# Patient Record
Sex: MALE | Employment: FULL TIME | URBAN - METROPOLITAN AREA
[De-identification: names, ages, dates, MRNs, and addresses within clinical notes are randomized per-mention and may not be internally consistent; named-entity substitution may affect disease eponyms.]

---

## 2024-08-09 ENCOUNTER — OFFICE VISIT (OUTPATIENT)
Dept: FAMILY MEDICINE | Facility: CLINIC | Age: 49
End: 2024-08-09
Payer: COMMERCIAL

## 2024-08-09 ENCOUNTER — ORDERS ONLY (AUTO-RELEASED) (OUTPATIENT)
Dept: FAMILY MEDICINE | Facility: CLINIC | Age: 49
End: 2024-08-09

## 2024-08-09 VITALS
SYSTOLIC BLOOD PRESSURE: 117 MMHG | HEIGHT: 74 IN | BODY MASS INDEX: 34.6 KG/M2 | OXYGEN SATURATION: 95 % | WEIGHT: 269.6 LBS | DIASTOLIC BLOOD PRESSURE: 75 MMHG | TEMPERATURE: 98.1 F | RESPIRATION RATE: 20 BRPM | HEART RATE: 86 BPM

## 2024-08-09 DIAGNOSIS — J30.2 SEASONAL ALLERGIC RHINITIS, UNSPECIFIED TRIGGER: ICD-10-CM

## 2024-08-09 DIAGNOSIS — E78.1 HYPERTRIGLYCERIDEMIA: ICD-10-CM

## 2024-08-09 DIAGNOSIS — G47.33 OSA (OBSTRUCTIVE SLEEP APNEA): ICD-10-CM

## 2024-08-09 DIAGNOSIS — Z11.59 NEED FOR HEPATITIS C SCREENING TEST: ICD-10-CM

## 2024-08-09 DIAGNOSIS — M1A.0790 CHRONIC IDIOPATHIC GOUT INVOLVING TOE WITHOUT TOPHUS, UNSPECIFIED LATERALITY: ICD-10-CM

## 2024-08-09 DIAGNOSIS — Z12.11 SCREEN FOR COLON CANCER: Primary | ICD-10-CM

## 2024-08-09 DIAGNOSIS — F41.1 GAD (GENERALIZED ANXIETY DISORDER): ICD-10-CM

## 2024-08-09 DIAGNOSIS — I77.9 PERIPHERAL ARTERIAL OCCLUSIVE DISEASE (H): ICD-10-CM

## 2024-08-09 DIAGNOSIS — Z12.11 SCREEN FOR COLON CANCER: ICD-10-CM

## 2024-08-09 DIAGNOSIS — I77.810 DILATED AORTIC ROOT (H): ICD-10-CM

## 2024-08-09 DIAGNOSIS — K21.9 GASTROESOPHAGEAL REFLUX DISEASE, UNSPECIFIED WHETHER ESOPHAGITIS PRESENT: ICD-10-CM

## 2024-08-09 DIAGNOSIS — R19.5 POSITIVE COLORECTAL CANCER SCREENING USING COLOGUARD TEST: ICD-10-CM

## 2024-08-09 DIAGNOSIS — I10 PRIMARY HYPERTENSION: ICD-10-CM

## 2024-08-09 DIAGNOSIS — F33.2 SEVERE EPISODE OF RECURRENT MAJOR DEPRESSIVE DISORDER, WITHOUT PSYCHOTIC FEATURES (H): ICD-10-CM

## 2024-08-09 PROBLEM — H90.42 SENSORINEURAL HEARING LOSS (SNHL) OF LEFT EAR WITH UNRESTRICTED HEARING OF RIGHT EAR: Status: ACTIVE | Noted: 2022-03-18

## 2024-08-09 PROBLEM — M10.9 GOUT: Status: ACTIVE | Noted: 2023-05-25

## 2024-08-09 LAB
CREAT SERPL-MCNC: 1.06 MG/DL (ref 0.67–1.17)
EGFRCR SERPLBLD CKD-EPI 2021: 85 ML/MIN/1.73M2
ERYTHROCYTE [DISTWIDTH] IN BLOOD BY AUTOMATED COUNT: 12.4 % (ref 10–15)
HCT VFR BLD AUTO: 40.4 % (ref 40–53)
HCV AB SERPL QL IA: NONREACTIVE
HGB BLD-MCNC: 14.2 G/DL (ref 13.3–17.7)
MCH RBC QN AUTO: 33.5 PG (ref 26.5–33)
MCHC RBC AUTO-ENTMCNC: 35.1 G/DL (ref 31.5–36.5)
MCV RBC AUTO: 95 FL (ref 78–100)
PLATELET # BLD AUTO: 101 10E3/UL (ref 150–450)
RBC # BLD AUTO: 4.24 10E6/UL (ref 4.4–5.9)
URATE SERPL-MCNC: 6 MG/DL (ref 3.4–7)
WBC # BLD AUTO: 6.3 10E3/UL (ref 4–11)

## 2024-08-09 PROCEDURE — 84550 ASSAY OF BLOOD/URIC ACID: CPT | Performed by: PHYSICIAN ASSISTANT

## 2024-08-09 PROCEDURE — 85027 COMPLETE CBC AUTOMATED: CPT | Performed by: PHYSICIAN ASSISTANT

## 2024-08-09 PROCEDURE — 82565 ASSAY OF CREATININE: CPT | Performed by: PHYSICIAN ASSISTANT

## 2024-08-09 PROCEDURE — 90471 IMMUNIZATION ADMIN: CPT | Performed by: PHYSICIAN ASSISTANT

## 2024-08-09 PROCEDURE — 90750 HZV VACC RECOMBINANT IM: CPT | Performed by: PHYSICIAN ASSISTANT

## 2024-08-09 PROCEDURE — 36415 COLL VENOUS BLD VENIPUNCTURE: CPT | Performed by: PHYSICIAN ASSISTANT

## 2024-08-09 PROCEDURE — 96127 BRIEF EMOTIONAL/BEHAV ASSMT: CPT | Performed by: PHYSICIAN ASSISTANT

## 2024-08-09 PROCEDURE — 99204 OFFICE O/P NEW MOD 45 MIN: CPT | Mod: 25 | Performed by: PHYSICIAN ASSISTANT

## 2024-08-09 PROCEDURE — 86803 HEPATITIS C AB TEST: CPT | Performed by: PHYSICIAN ASSISTANT

## 2024-08-09 RX ORDER — FLUTICASONE PROPIONATE 50 MCG
2 SPRAY, SUSPENSION (ML) NASAL
COMMUNITY
Start: 2024-04-26 | End: 2024-08-09

## 2024-08-09 RX ORDER — ROSUVASTATIN CALCIUM 20 MG/1
20 TABLET, COATED ORAL DAILY
Qty: 90 TABLET | Refills: 3 | Status: SHIPPED | OUTPATIENT
Start: 2024-08-09

## 2024-08-09 RX ORDER — AZELASTINE 1 MG/ML
2 SPRAY, METERED NASAL 2 TIMES DAILY
COMMUNITY
Start: 2024-05-28 | End: 2024-08-09

## 2024-08-09 RX ORDER — COLCHICINE 0.6 MG/1
0.6 TABLET ORAL
COMMUNITY
Start: 2024-04-26 | End: 2024-08-09

## 2024-08-09 RX ORDER — METOPROLOL SUCCINATE 25 MG/1
25 TABLET, EXTENDED RELEASE ORAL DAILY
Qty: 90 TABLET | Refills: 3 | Status: SHIPPED | OUTPATIENT
Start: 2024-08-09

## 2024-08-09 RX ORDER — FAMOTIDINE 20 MG/1
20 TABLET, FILM COATED ORAL
COMMUNITY
Start: 2024-04-26 | End: 2024-08-09

## 2024-08-09 RX ORDER — BUSPIRONE HYDROCHLORIDE 10 MG/1
20 TABLET ORAL 3 TIMES DAILY
COMMUNITY
Start: 2024-04-26 | End: 2024-08-09

## 2024-08-09 RX ORDER — ALLOPURINOL 100 MG/1
2 TABLET ORAL DAILY
COMMUNITY
Start: 2024-07-25

## 2024-08-09 RX ORDER — IBUPROFEN 200 MG
TABLET ORAL
COMMUNITY

## 2024-08-09 RX ORDER — AZELASTINE 1 MG/ML
2 SPRAY, METERED NASAL 2 TIMES DAILY
Qty: 120 ML | Refills: 3 | Status: SHIPPED | OUTPATIENT
Start: 2024-08-09

## 2024-08-09 RX ORDER — FAMOTIDINE 20 MG/1
20 TABLET, FILM COATED ORAL 2 TIMES DAILY
Qty: 180 TABLET | Refills: 1 | Status: SHIPPED | OUTPATIENT
Start: 2024-08-09

## 2024-08-09 RX ORDER — FLUTICASONE PROPIONATE 50 MCG
2 SPRAY, SUSPENSION (ML) NASAL DAILY
Qty: 48 G | Refills: 3 | Status: SHIPPED | OUTPATIENT
Start: 2024-08-09

## 2024-08-09 RX ORDER — ROSUVASTATIN CALCIUM 10 MG/1
10 TABLET, COATED ORAL DAILY
COMMUNITY
Start: 2024-04-26 | End: 2024-08-09

## 2024-08-09 RX ORDER — SERTRALINE HYDROCHLORIDE 100 MG/1
150 TABLET, FILM COATED ORAL DAILY
Qty: 135 TABLET | Refills: 1 | Status: SHIPPED | OUTPATIENT
Start: 2024-08-09

## 2024-08-09 RX ORDER — ASPIRIN 81 MG/1
81 TABLET, CHEWABLE ORAL
COMMUNITY

## 2024-08-09 RX ORDER — BUSPIRONE HYDROCHLORIDE 10 MG/1
20 TABLET ORAL 3 TIMES DAILY
Qty: 270 TABLET | Refills: 1 | Status: SHIPPED | OUTPATIENT
Start: 2024-08-09

## 2024-08-09 ASSESSMENT — PATIENT HEALTH QUESTIONNAIRE - PHQ9
SUM OF ALL RESPONSES TO PHQ QUESTIONS 1-9: 4
SUM OF ALL RESPONSES TO PHQ QUESTIONS 1-9: 4
10. IF YOU CHECKED OFF ANY PROBLEMS, HOW DIFFICULT HAVE THESE PROBLEMS MADE IT FOR YOU TO DO YOUR WORK, TAKE CARE OF THINGS AT HOME, OR GET ALONG WITH OTHER PEOPLE: SOMEWHAT DIFFICULT

## 2024-08-09 ASSESSMENT — ANXIETY QUESTIONNAIRES
GAD7 TOTAL SCORE: 4
8. IF YOU CHECKED OFF ANY PROBLEMS, HOW DIFFICULT HAVE THESE MADE IT FOR YOU TO DO YOUR WORK, TAKE CARE OF THINGS AT HOME, OR GET ALONG WITH OTHER PEOPLE?: SOMEWHAT DIFFICULT
5. BEING SO RESTLESS THAT IT IS HARD TO SIT STILL: NOT AT ALL
GAD7 TOTAL SCORE: 4
7. FEELING AFRAID AS IF SOMETHING AWFUL MIGHT HAPPEN: NOT AT ALL
4. TROUBLE RELAXING: SEVERAL DAYS
7. FEELING AFRAID AS IF SOMETHING AWFUL MIGHT HAPPEN: NOT AT ALL
GAD7 TOTAL SCORE: 4
IF YOU CHECKED OFF ANY PROBLEMS ON THIS QUESTIONNAIRE, HOW DIFFICULT HAVE THESE PROBLEMS MADE IT FOR YOU TO DO YOUR WORK, TAKE CARE OF THINGS AT HOME, OR GET ALONG WITH OTHER PEOPLE: SOMEWHAT DIFFICULT
2. NOT BEING ABLE TO STOP OR CONTROL WORRYING: NOT AT ALL
3. WORRYING TOO MUCH ABOUT DIFFERENT THINGS: SEVERAL DAYS
6. BECOMING EASILY ANNOYED OR IRRITABLE: SEVERAL DAYS
1. FEELING NERVOUS, ANXIOUS, OR ON EDGE: SEVERAL DAYS

## 2024-08-09 NOTE — PROGRESS NOTES
Prior to immunization administration, verified patients identity using patient s name and date of birth. Please see Immunization Activity for additional information.     Screening Questionnaire for Adult Immunization    Are you sick today?   No   Do you have allergies to medications, food, a vaccine component or latex?   No   Have you ever had a serious reaction after receiving a vaccination?   No   Do you have a long-term health problem with heart, lung, kidney, or metabolic disease (e.g., diabetes), asthma, a blood disorder, no spleen, complement component deficiency, a cochlear implant, or a spinal fluid leak?  Are you on long-term aspirin therapy?   No   Do you have cancer, leukemia, HIV/AIDS, or any other immune system problem?   No   Do you have a parent, brother, or sister with an immune system problem?   No   In the past 3 months, have you taken medications that affect  your immune system, such as prednisone, other steroids, or anticancer drugs; drugs for the treatment of rheumatoid arthritis, Crohn s disease, or psoriasis; or have you had radiation treatments?   No   Have you had a seizure, or a brain or other nervous system problem?   No   During the past year, have you received a transfusion of blood or blood    products, or been given immune (gamma) globulin or antiviral drug?   No   For women: Are you pregnant or is there a chance you could become       pregnant during the next month?   No   Have you received any vaccinations in the past 4 weeks?   No     Immunization questionnaire answers were all negative.      Patient instructed to remain in clinic for 15 minutes afterwards, and to report any adverse reactions.     Screening performed by Laurie Moreno on 8/9/2024 at 9:59 AM.

## 2024-08-09 NOTE — PROGRESS NOTES
Assessment & Plan     Screen for colon cancer  Due.  Discussed colonoscopy first Cologuard.  No contraindications to Cologuard.  Patient prefers this.  - COLOGUARD([facility]); Future    Need for hepatitis C screening test  2.  Not seen on [facility] Everywhere.  - Hepatitis C Screen Reflex to HCV RNA Quant and Genotype; Future    Seasonal allergic rhinitis, unspecified trigger  On current regimen.  Will maintain.  Recheck at annual wellness visits.  - azelastine (ASTELIN) 0.1 % nasal spray; Spray 2 sprays into both nostrils 2 times daily  - fluticasone (FLONASE) 50 MCG/ACT nasal spray; Spray 2 sprays into both nostrils daily  Medication use and side effects discussed with the patient. Patient is in complete understanding and agreement with plan.     CAYLA (generalized anxiety disorder)  Stable on current regimen.  PHQ-9 and CAYLA-7 are up-to-date and in controlled range.  Will continue and recheck at annual wellness at end of April of next year.  Sooner if concerns.  - sertraline (ZOLOFT) 100 MG tablet; Take 1.5 tablets (150 mg) by mouth daily  - busPIRone (BUSPAR) 10 MG tablet; Take 2 tablets (20 mg) by mouth 3 times daily  Medication use and side effects discussed with the patient. Patient is in complete understanding and agreement with plan.     Peripheral arterial occlusive disease (H24)  Past history.  Asymptomatic at this time.  Stable on Crestor as well as aspirin however LDL not at goal.  Would recommend this being under 70 if possible.  Also with PAD history would recommend triglyceride goal to be under 150.  Will increase Crestor and recheck fasting lipids in approximately 3 months and we will continue to max out statin until either LDL is under 50 or maxed out dose.  If triglycerides remain above 150 consider possible omega-3 treatments.  - rosuvastatin (CRESTOR) 20 MG tablet; Take 1 tablet (20 mg) by mouth daily  Medication use and side effects discussed with the patient. Patient is in complete  understanding and agreement with plan.     Hypertriglyceridemia  As above    CHRISTIANO (obstructive sleep apnea)  Future pulm appointment planned.    Severe episode of recurrent major depressive disorder, without psychotic features (H)  Stable on current regimen.  Refills not needed at this time.  Will maintain as above.  - sertraline (ZOLOFT) 100 MG tablet; Take 1.5 tablets (150 mg) by mouth daily  Medication use and side effects discussed with the patient. Patient is in complete understanding and agreement with plan.     Chronic idiopathic gout involving toe without tophus, unspecified laterality  No side effects of allopurinol.  Has not checked uric acid since dose was started.  Will recheck today and adjust dose if necessary otherwise to maintain and recheck on annual basis likely in April of next year with other remaining labs.  - Uric acid; Future  - CBC with platelets; Future  - Creatinine; Future    Primary hypertension  Stable on current regimen.  Profiled refill sent to pharmacy.  - metoprolol succinate ER (TOPROL XL) 25 MG 24 hr tablet; Take 1 tablet (25 mg) by mouth daily  Medication use and side effects discussed with the patient. Patient is in complete understanding and agreement with plan.     Gastroesophageal reflux disease, unspecified whether esophagitis present  Stable on current regimen.  Profiled refill sent to pharmacy.  - famotidine (PEPCID) 20 MG tablet; Take 1 tablet (20 mg) by mouth 2 times daily  Medication use and side effects discussed with the patient. Patient is in complete understanding and agreement with plan.     Dilated aortic root (H24)  Past history.  Will obtain annual monitoring.  Did see decrease in size from previous echo.  - Echocardiogram Complete; Future        Subjective   Jared is a 49 year old, presenting for the following health issues:  [facility] [place] [facility]        8/9/2024     9:13 AM   Additional Questions   Roomed by Rosa SINGH LPN     History of Present Illness       Hyperlipidemia:   He presents for follow up of hyperlipidemia.   He is taking medication to lower cholesterol. He is not having myalgia or other side effects to statin medications.    Reason for visit:  Establish new primary    He eats 2-3 servings of fruits and vegetables daily.He consumes 0 sweetened beverage(s) daily.He exercises with enough effort to increase his heart rate 20 to 29 minutes per day.  He exercises with enough effort to increase his heart rate 3 or less days per week.   He is taking medications regularly.       Patient is a 49-year-old male with a past history of major depressive disorder, general anxiety disorder, peripheral dural disease, hypertriglyceridemia, sleep apnea chronic gout, GERD, seasonal allergies, and a dilated aortic root.  He presents today after an insurance change to establish care.    Per Care Everywhere routine labs were all obtained approximately 3 months ago.  Uric acid was elevated and allopurinol 200 mg was started with bridging of 3 months of colchicine.  Colchicine is now complete and he continues allopurinol.  No gout flares.  No side effects.  He has not had labs checked since starting this medication.    In regards to allergies, stable on current regimen with no side effects.  Denies any refills for now but will need in the near future.    In regards to gout, takes Pepcid twice daily at 20 mg manages symptoms well.  Has attempted to stop this in the past but this is resulted in worsening symptoms.  No side effects.    In regards to peripheral arterial disease, patient has a past history of sudden mottling of right lower foot and via arteriogram it was discovered to have an acute occlusion of his dorsalis pedis artery.  No obvious etiology to this and patient states symptoms resolved spontaneously.  He is continued on Crestor as well as aspirin since.  Lipids last checked in April 2024 showed LDL of 81 and triglycerides of 436.  No side effects of aspirin or Crestor.    In regards to  "dilated aortic root, past history of this and has been monitored annually with echocardiograms.  Last echo was 4.2 cm in June 2023.  Patient has a history of hypertension and stable on low-dose metoprolol no side effects.  Also history of sleep apnea stable with CPAP and follows closely with sleep medicine.  Has future appointment to establish in the [facility] system in October of this year.    In regards to depression and anxiety, stable on current regimen of BuSpar 20 mg 3 times daily as well as Zoloft 150 mg daily.  PHQ-9 and CAYLA-7 as below.  In remission ranges.  No side effects of medications.    Patient is overdue for colon cancer screening and he is aware of this.        8/9/2024     9:10 AM   PHQ   PHQ-9 Total Score 4   Q9: Thoughts of better off dead/self-harm past 2 weeks Not at all         8/9/2024     9:10 AM   CAYLA-7 SCORE   Total Score 4 (minimal anxiety)   Total Score 4               Objective    /75   Pulse 86   Temp 98.1  F (36.7  C) (Oral)   Resp 20   Ht 1.88 m (6' 2.02\")   Wt 122.3 kg (269 lb 9.6 oz)   SpO2 95%   BMI 34.60 kg/m    Body mass index is 34.6 kg/m .  Physical Exam   GENERAL: alert and no distress  RESP: lungs clear to auscultation - no rales, rhonchi or wheezes  CV: regular rates and rhythm, normal S1 S2, no S3 or S4, and no murmur, click or rub  PSYCH: mentation appears normal, affect normal/bright          Signed Electronically by: Apolinar العراقي PA-C    "

## 2024-08-30 ENCOUNTER — HOSPITAL ENCOUNTER (OUTPATIENT)
Dept: CARDIOLOGY | Facility: HOSPITAL | Age: 49
Discharge: HOME | End: 2024-08-30
Attending: PHYSICIAN ASSISTANT | Admitting: PHYSICIAN ASSISTANT
Payer: COMMERCIAL

## 2024-08-30 DIAGNOSIS — I77.810 DILATED AORTIC ROOT (H): ICD-10-CM

## 2024-08-30 LAB — LVEF ECHO: NORMAL

## 2024-08-30 PROCEDURE — 93306 TTE W/DOPPLER COMPLETE: CPT

## 2024-08-30 PROCEDURE — 93306 TTE W/DOPPLER COMPLETE: CPT | Mod: 26 | Performed by: INTERNAL MEDICINE

## 2024-08-31 LAB — NONINV COLON CA DNA+OCC BLD SCRN STL QL: POSITIVE

## 2024-09-15 ENCOUNTER — HEALTH MAINTENANCE LETTER (OUTPATIENT)
Age: 49
End: 2024-09-15

## 2024-10-25 ENCOUNTER — OFFICE VISIT (OUTPATIENT)
Dept: SLEEP MEDICINE | Facility: CLINIC | Age: 49
End: 2024-10-25
Payer: COMMERCIAL

## 2024-10-25 VITALS
SYSTOLIC BLOOD PRESSURE: 121 MMHG | HEIGHT: 74 IN | HEART RATE: 85 BPM | DIASTOLIC BLOOD PRESSURE: 75 MMHG | OXYGEN SATURATION: 95 % | WEIGHT: 268.3 LBS | BODY MASS INDEX: 34.43 KG/M2

## 2024-10-25 DIAGNOSIS — G47.52 RBD (REM BEHAVIORAL DISORDER): ICD-10-CM

## 2024-10-25 DIAGNOSIS — G47.33 OSA ON CPAP: Primary | ICD-10-CM

## 2024-10-25 PROCEDURE — 99203 OFFICE O/P NEW LOW 30 MIN: CPT | Performed by: STUDENT IN AN ORGANIZED HEALTH CARE EDUCATION/TRAINING PROGRAM

## 2024-10-25 ASSESSMENT — SLEEP AND FATIGUE QUESTIONNAIRES
HOW LIKELY ARE YOU TO NOD OFF OR FALL ASLEEP WHILE SITTING AND TALKING TO SOMEONE: WOULD NEVER DOZE
HOW LIKELY ARE YOU TO NOD OFF OR FALL ASLEEP WHILE WATCHING TV: SLIGHT CHANCE OF DOZING
HOW LIKELY ARE YOU TO NOD OFF OR FALL ASLEEP WHILE SITTING QUIETLY AFTER LUNCH WITHOUT ALCOHOL: WOULD NEVER DOZE
HOW LIKELY ARE YOU TO NOD OFF OR FALL ASLEEP IN A CAR, WHILE STOPPED FOR A FEW MINUTES IN TRAFFIC: WOULD NEVER DOZE
HOW LIKELY ARE YOU TO NOD OFF OR FALL ASLEEP WHEN YOU ARE A PASSENGER IN A CAR FOR AN HOUR WITHOUT A BREAK: WOULD NEVER DOZE
HOW LIKELY ARE YOU TO NOD OFF OR FALL ASLEEP WHILE SITTING AND READING: WOULD NEVER DOZE
HOW LIKELY ARE YOU TO NOD OFF OR FALL ASLEEP WHILE LYING DOWN TO REST IN THE AFTERNOON WHEN CIRCUMSTANCES PERMIT: MODERATE CHANCE OF DOZING
HOW LIKELY ARE YOU TO NOD OFF OR FALL ASLEEP WHILE SITTING INACTIVE IN A PUBLIC PLACE: WOULD NEVER DOZE

## 2024-10-25 NOTE — PROGRESS NOTES
[facility]  Consultation Note    Name: Jared Webber MRN#: [id number]   Age: 49 year old YOB: 1975     Date of Consultation: 10/25/24  Consultation is requested by: Bee Cotton  Primary care provider: Apolinar العراقي PA-C    History of Present Illness:   Jared Webber is a 49 year old male patient with CHRISTIANO, RBD, MDD, CAYLA, HTN, HLD, GERD, gout, dilated aortic route, and SNHL  He is sent by Bee Cotton for a sleep consultation regarding Sleep Problem (Establish care, using CPAP )  .    Jared Webber main reason for visit: (Patient-Rptd) Establish new provider  Patient states problem(s) started: (Patient-Rptd) 20 years ago  Jared Webber's goals for this visit: (Patient-Rptd) Establish new provider and evaluate efficacy of current treatment    He has had a previous sleep study about 1 years ago. Important findings: AHI 63, PLM index 18, PLM arousal index 1.    Do you use a CPAP Machine at home: Yes  DME: [facility] would like to transfer care to [facility]  Overall, on a scale of 0-10 how would you rate your CPAP (0 poor, 10 great): 9    What type of mask do you use: Nasal Pillow  Is your mask comfortable: Yes  If not, why:      Is your mask leaking: No  If yes, where do you feel it:    How many night per week does the mask leak (0-7):      Do you notice snoring with mask on: No  Do you notice gasping arousals with mask on: No  Are you having significant oral or nasal dryness: Yes  Is the pressure setting comfortable: Yes  If not, why:      What is your typical bedtime:    How long does it take you to go to sleep on PAP therapy:    What time do you typically get out of bed for the day:    How many hours on average per night are you using PAP therapy:    How many hours are you sleeping per night:    Do you feel well rested in the morning: No    ResMed   CPAP 8 cmH2O 30 day usage data:  99% of days with > 4 hours of use. 1/30 days with no use.   Average  use 9 hours 3 minutes per day.   95%ile Leak 6.2 L/min.   AHI 1.1 events per hour.       Assessment and Plan:     Problem List Items Addressed This Visit       CHRISTIANO on CPAP - Primary     Jared Webber has Severe Sleep apnea. He is tolerating PAP well and reports adequate compliance with PAP therapy. Daytime symptoms are improved and reports positive benefits with PAP use.   CHRISTIANO is adequately controlled with Auto CPAP at the current settings per compliance DL.   Prescription provided for renewal of PAP supplies.  Recommended him/her to continue using the CPAP regularly during sleep and instructed  to get  the supplies for the PAP replaced regularly.    Patient instructed to remember to bring PAP with him/her if hospitalized and if anticipating procedure that requires sedation/surgery to be sure to discuss with the provider/surgeon that he/she has sleep apnea and uses PAP therapy.          Relevant Orders    Comprehensive DME (Completed)       SLEEP-WAKE SCHEDULE:   Work/School Days: Patient goes to school/work: (Patient-Rptd) Yes   Usually gets into bed at (Patient-Rptd) 10:00  Takes patient about (Patient-Rptd) 15 minutes to fall asleep  Has trouble falling asleep (Patient-Rptd) 2 nights per week  Wakes up in the middle of the night (Patient-Rptd) 4 times.  Wakes up due to (Patient-Rptd) Anxiety;Uncertain  He has trouble falling back asleep (Patient-Rptd) 4 times a week.   It usually takes (Patient-Rptd) 15 to get back to sleep  Patient is usually up at (Patient-Rptd) 6:45  Uses alarm: (Patient-Rptd) Yes  Sleep Inertia: Yes    Weekends/Non-work Days/All Other Days:  Usually gets into bed at (Patient-Rptd) 10:00   Takes patient about (Patient-Rptd) 15 to fall asleep  Patient is usually up at (Patient-Rptd) 6:45  Uses alarm: (Patient-Rptd) Yes    Sleep Need  Patient gets  (Patient-Rptd) 8 hiurs sleep on average   Patient thinks He needs about (Patient-Rptd) 10 hours sleep    Jared Webber prefers to sleep in  this position(s): (Patient-Rptd) Back;Side   Patient states they do the following activities in bed: (Patient-Rptd) Use phone, computer, or tablet    Naps  Patient takes a purposeful nap (Patient-Rptd) 0 times a week and naps are usually (Patient-Rptd) 0 in duration  He feels better after a nap:    He dozes off unintentionally (Patient-Rptd) 0 days per week  Patient has had a driving accident or near-miss due to sleepiness/drowsiness: (Patient-Rptd) No      SLEEP DISRUPTIONS:  Breathing/Snoring  Patient snores:(Patient-Rptd) Yes  Other people complain about His snoring: (Patient-Rptd) No  Patient has been told He stops breathing in His sleep:(Patient-Rptd) Yes  He has issues with the following: (Patient-Rptd) Morning headaches;Stuffy nose when you wake up    Movement:  Patient gets pain, discomfort, with an urge to move:  (Patient-Rptd) Yes  It happens when He is resting:  (Patient-Rptd) Yes  It happens more at night:  (Patient-Rptd) Yes  Patient has been told Jared Webber kicks His legs at night:  (Patient-Rptd) Yes     Behaviors in Sleep:  Jared Webber has experienced the following behaviors while sleeping: (Patient-Rptd) Sleep-talking;Teeth grinding;Kicking or punching  He has experienced sudden muscle weakness during the day: (Patient-Rptd) No  Anosmia: No   Constipation: No   Orthostasis: Yes, patient believes it to be 2/2 dilated aortic root   Resting Tremor: No  Unstable gait: Yes, patient believes it to be 2/2 hearing loss and changes in balance; denies shuffling   Muscle Rigidity: No       Is there anything else you would like your sleep provider to know:        CAFFEINE AND OTHER SUBSTANCES:  Patient consumes caffeinated beverages per day:  (Patient-Rptd) 5  Last caffeine use is usually: (Patient-Rptd) 3  List of any prescribed or over the counter stimulants that patient takes: (Patient-Rptd) Na  List of any prescribed or over the counter sleep medication patient takes: (Patient-Rptd)  Melatonin  List of previous sleep medications that patient has tried:    Patient drinks alcohol to help them sleep: (Patient-Rptd) No  Patient drinks alcohol near bedtime: (Patient-Rptd) Yes    Family History:  Patient has a family member been diagnosed with a sleep disorder: (Patient-Rptd) No            SCALES:  EPWORTH SLEEPINESS SCALE       10/25/2024     1:23 PM    [place] Sleepiness Scale ( MARNI Olson  [id number]<br>ESS - [place]/English - Final version - 21 Nov 07 - [facility] [place].)   Sitting and reading Would never doze   Watching TV Slight chance of dozing   Sitting, inactive in a public place (e.g. a theatre or a meeting) Would never doze   As a passenger in a car for an hour without a break Would never doze   Lying down to rest in the afternoon when circumstances permit Moderate chance of dozing   Sitting and talking to someone Would never doze   Sitting quietly after a lunch without alcohol Would never doze   In a car, while stopped for a few minutes in traffic Would never doze   [place] Score (MC) 3   [place] Score (Sleep) 3        Patient-reported       INSOMNIA SEVERITY INDEX (SUMMER)        10/25/2024     1:14 PM   Insomnia Severity Index (SUMMER)   Difficulty falling asleep 2    Difficulty staying asleep 2    Problems waking up too early 1    How SATISFIED/DISSATISFIED are you with your CURRENT sleep pattern? 2    How NOTICEABLE to others do you think your sleep problem is in terms of impairing the quality of your life? 2    How WORRIED/DISTRESSED are you about your current sleep problem? 2    To what extent do you consider your sleep problem to INTERFERE with your daily functioning (e.g. daytime fatigue, mood, ability to function at work/daily chores, concentration, memory, mood, etc.) CURRENTLY? 2    SUMMER Total Score 13        Patient-reported    Guidelines for Scoring/Interpretation:  Total score categories:  0-7 = No clinically significant insomnia   8-14 = Subthreshold insomnia   15-21 = Clinical  "insomnia (moderate severity)  22-28 = Clinical insomnia (severe)  Used via courtesy of www.myhealth.[place].gov with permission from Walter Hubbard PhD., [facility]      STOP BANG   CHRISTIANO High Risk             Total Score: 6    CHRISTIANO: Often tired    CHRISTIANO: Observed stopped breathing    CHRISTIANO: Hypertension    CHRISTIANO: Over 50 ys old    CHRISTIANO: Neck Circum >16 in    CHRISTIANO: Male          GAD7      8/9/2024     9:10 AM   CAYLA-7    1. Feeling nervous, anxious, or on edge 1    2. Not being able to stop or control worrying 0    3. Worrying too much about different things 1    4. Trouble relaxing 1    5. Being so restless that it is hard to sit still 0    6. Becoming easily annoyed or irritable 1    7. Feeling afraid, as if something awful might happen 0    CAYLA-7 Total Score 4   If you checked any problems, how difficult have they made it for you to do your work, take care of things at home, or get along with other people? Somewhat difficult        Patient-reported         CAGE-AID       No data to display              CAGE-AID reprinted with permission from the [place] [employer], ANDJA Packer. and TOÑA Nunez, \"Conjoint screening questionnaires for alcohol and drug abuse\" [place] Medical Journal 94: 135-140, 1995.      PATIENT HEALTH QUESTIONNAIRE-9 (PHQ - 9)      8/9/2024     9:10 AM   PHQ-9 ([employer])   1.  Little interest or pleasure in doing things 0    2.  Feeling down, depressed, or hopeless 0    3.  Trouble falling or staying asleep, or sleeping too much 2    4.  Feeling tired or having little energy 1    5.  Poor appetite or overeating 1    6.  Feeling bad about yourself - or that you are a failure or have let yourself or your family down 0    7.  Trouble concentrating on things, such as reading the newspaper or watching television 0    8.  Moving or speaking so slowly that other people could have noticed. Or the opposite - being so fidgety or restless that you have been moving around a lot more than usual 0    9.  Thoughts " that you would be better off dead, or of hurting yourself in some way 0    PHQ-9 Total Score 4   6.  Feeling bad about yourself 0    7.  Trouble concentrating 0    8.  Moving slowly or restless 0    9.  Suicidal or self-harm thoughts 0    1.  Little interest or pleasure in doing things Not at all   2.  Feeling down, depressed, or hopeless Not at all   3.  Trouble falling or staying asleep, or sleeping too much More than half the days   4.  Feeling tired or having little energy Several days   5.  Poor appetite or overeating Several days   6.  Feeling bad about yourself Not at all   7.  Trouble concentrating Not at all   8.  Moving slowly or restless Not at all   9.  Suicidal or self-harm thoughts Not at all   PHQ-9 via [facility] TOTAL SCORE-----> 4 (Minimal depression)   Difficulty at work, home, or with people Somewhat difficult       Patient-reported     Developed by Mrevat London, Frances Spence, Deonte Finn and colleagues, with an educational vinay from [employer] No permission required to reproduce, translate, display or distribute.      Allergies:    No Known Allergies    Medications:    Current Outpatient Medications   Medication Sig Dispense Refill    allopurinol (ZYLOPRIM) 100 MG tablet Take 2 tablets by mouth daily      aspirin (ASA) 81 MG chewable tablet Take 81 mg by mouth      azelastine (ASTELIN) 0.1 % nasal spray Spray 2 sprays into both nostrils 2 times daily 120 mL 3    busPIRone (BUSPAR) 10 MG tablet Take 2 tablets (20 mg) by mouth 3 times daily 270 tablet 1    famotidine (PEPCID) 20 MG tablet Take 1 tablet (20 mg) by mouth 2 times daily 180 tablet 1    fluticasone (FLONASE) 50 MCG/ACT nasal spray Spray 2 sprays into both nostrils daily 48 g 3    ibuprofen (ADVIL/MOTRIN) 200 MG tablet Take 1-2 tablets by mouth as needed      loratadine (CLARITIN) 10 MG tablet Take 10 mg by mouth daily      metoprolol succinate ER (TOPROL XL) 25 MG 24 hr tablet Take 1 tablet (25 mg) by mouth daily 90  tablet 3    rosuvastatin (CRESTOR) 20 MG tablet Take 1 tablet (20 mg) by mouth daily 90 tablet 3    sertraline (ZOLOFT) 100 MG tablet Take 1.5 tablets (150 mg) by mouth daily 135 tablet 1    VITAMIN D, CHOLECALCIFEROL, PO Take by mouth 2 times daily         Problem List:  Patient Active Problem List    Diagnosis Date Noted    Seasonal allergic rhinitis, unspecified trigger 08/09/2024     Priority: Medium    Gastroesophageal reflux disease, unspecified whether esophagitis present 08/09/2024     Priority: Medium    Primary hypertension 08/09/2024     Priority: Medium    Gout 05/25/2023     Priority: Medium    Sensorineural hearing loss (SNHL) of left ear with unrestricted hearing of right ear 03/18/2022     Priority: Medium    Peripheral arterial occlusive disease (H) 06/20/2019     Priority: Medium    CAYLA (generalized anxiety disorder) 02/24/2016     Priority: Medium    CHRISTIANO on CPAP 02/24/2016     Priority: Medium    Dilated aortic root (H)      Priority: Medium    Family history of genetic disease      Priority: Medium     family history of Marfan's Syndrome      Myopia      Priority: Medium    Hypertriglyceridemia 03/14/2013     Priority: Medium     Formatting of this note might be different from the original.   ICD 10      Vitamin D deficiency 04/24/2012     Priority: Medium    Severe episode of recurrent major depressive disorder, without psychotic features (H) 10/19/2010     Priority: Medium        Past Medical/Surgical History:  Past Medical History:   Diagnosis Date    Dilated aortic root (H)     Family history of genetic disease     family history of Marfan's Syndrome    Myopia      Past Surgical History:   Procedure Laterality Date    IR LOWER EXTREMITY ANGIOGRAM RIGHT  6/19/2019       Social History:  Social History     Socioeconomic History    Marital status:      Spouse name: Not on file    Number of children: Not on file    Years of education: Not on file    Highest education level: Not on file    Occupational History    Not on file   Tobacco Use    Smoking status: Never    Smokeless tobacco: Never   Substance and Sexual Activity    Alcohol use: Not on file    Drug use: Not on file    Sexual activity: Not on file   Other Topics Concern    Not on file   Social History Narrative    Not on file     Social Drivers of Health     Financial Resource Strain: Low Risk  (8/9/2024)    Financial Resource Strain     Within the past 12 months, have you or your family members you live with been unable to get utilities (heat, electricity) when it was really needed?: No   Food Insecurity: Low Risk  (8/9/2024)    Food Insecurity     Within the past 12 months, did you worry that your food would run out before you got money to buy more?: No     Within the past 12 months, did the food you bought just not last and you didn t have money to get more?: No   Transportation Needs: Low Risk  (8/9/2024)    Transportation Needs     Within the past 12 months, has lack of transportation kept you from medical appointments, getting your medicines, non-medical meetings or appointments, work, or from getting things that you need?: No   Physical Activity: Not on file   Stress: Not on file   Social Connections: Not on file   Interpersonal Safety: Low Risk  (8/9/2024)    Interpersonal Safety     Do you feel physically and emotionally safe where you currently live?: Yes     Within the past 12 months, have you been hit, slapped, kicked or otherwise physically hurt by someone?: No     Within the past 12 months, have you been humiliated or emotionally abused in other ways by your partner or ex-partner?: No   Housing Stability: Low Risk  (8/9/2024)    Housing Stability     Do you have housing? : Yes     Are you worried about losing your housing?: No       Family History:  No family history on file.    Review of Systems:   A complete review of systems reviewed by me is negative with the exeption of what has been mentioned in the history of present  "illness.  In the last TWO WEEKS have you experienced any of the following symptoms?  Fevers: (Patient-Rptd) No  Night Sweats: (Patient-Rptd) No  Weight Gain: (Patient-Rptd) No  Pain at Night: (Patient-Rptd) No  Double Vision: (Patient-Rptd) No  Changes in Vision: (Patient-Rptd) No  Difficulty Breathing through Nose: (Patient-Rptd) No  Sore Throat in Morning: (Patient-Rptd) No  Dry Mouth in the Morning: (Patient-Rptd) No  Shortness of Breath Lying Flat: (Patient-Rptd) No  Shortness of Breath With Activity: (Patient-Rptd) No  Awakening with Shortness of Breath: (Patient-Rptd) No  Increased Cough: (Patient-Rptd) No  Heart Racing at Night: (Patient-Rptd) No  Swelling in Feet or Legs: (Patient-Rptd) No  Diarrhea at Night: (Patient-Rptd) No  Heartburn at Night: (Patient-Rptd) No  Urinating More than Once at Night: (Patient-Rptd) No  Losing Control of Urine at Night: (Patient-Rptd) No  Joint Pains at Night: (Patient-Rptd) No  Headaches in Morning: (Patient-Rptd) No  Weakness in Arms or Legs: (Patient-Rptd) No  Depressed Mood: (Patient-Rptd) Yes  Anxiety: (Patient-Rptd) Yes     Physical Exam:   Vitals: /75   Pulse 85   Ht 1.88 m (6' 2.02\")   Wt 121.7 kg (268 lb 4.8 oz)   SpO2 95%   BMI 34.43 kg/m    BMI= Body mass index is 34.43 kg/m .  Neck Cir (cm): 51 cm  GENERAL: alert and no distress  EYES: Eyes grossly normal to inspection.  No discharge or erythema, or obvious scleral/conjunctival abnormalities.  RESP: No audible wheeze, cough, or visible cyanosis.    SKIN: Visible skin clear. No significant rash, abnormal pigmentation or lesions.  NEURO: Cranial nerves grossly intact.  Mentation and speech appropriate for age.  PSYCH: Appropriate affect, tone, and pace of words         Data: All pertinent previous laboratory data reviewed     Recent Labs   Lab Test 08/09/24  1007   CR 1.06       Recent Labs   Lab Test 08/09/24  1007   WBC 6.3   RBC 4.24*   HGB 14.2   HCT 40.4   MCV 95   MCH 33.5*   MCHC 35.1   RDW 12.4 " "  *       No results for input(s): \"PROTTOTAL\", \"ALBUMIN\", \"BILITOTAL\", \"ALKPHOS\", \"AST\", \"ALT\", \"BILIDIRECT\" in the last 26910 hours.    TSH (mU/L)   Date Value   09/02/2014 1.65       No results found for: \"UAMP\", \"UBARB\", \"BENZODIAZEUR\", \"UCANN\", \"UCOC\", \"OPIT\", \"UPCP\"    No results found for: \"IRONSAT\", \"BK40066\", \"KELL\"    No results found for: \"PH\", \"PHARTERIAL\", \"PO2\", \"IA3XJMYPWTJ\", \"SAT\", \"PCO2\", \"HCO3\", \"BASEEXCESS\", \"DONA\", \"BEB\"    @LABRCNTIPR(phv:4,pco2v:4,po2v:4,hco3v:4,romel:4,o2per:4)@    Echocardiology: No results found for this or any previous visit (from the past 4320 hours).    Chest x-ray:   No results found for this or any previous visit from the past 365 days.      Chest CT:   No results found for this or any previous visit from the past 365 days.      PFT: Most Recent [facility] Pulmonary Function Testing  No results found     Patient was strongly advised to avoid driving, operating any heavy machinery or other hazardous situations while drowsy or sleepy.  Patient was counseled on the importance of driving while alert, to pull over if drowsy, or nap before getting into the vehicle if sleepy.      Plan is for Jared Webber to follow up in about 12 month(s).     The above note was dictated using voice recognition software. Although reviewed after completion, some word and grammatical error may remain . Please contact the author for any clarifications.    Total time spent reviewing medical records, history and physical examination, review of previous testing and interpretation as well as documentation on this date, 10/25/24: 41 minutes    Bee Cotton MD on 10/25/24  [facility] [place]   [address]oor [address], [address]   [address] [place] [address]   Appointments: [phone]     CC: Bee Cotton   "

## 2024-10-25 NOTE — ASSESSMENT & PLAN NOTE
Jared Webber has Severe Sleep apnea. He is tolerating PAP well and reports adequate compliance with PAP therapy. Daytime symptoms are improved and reports positive benefits with PAP use.   CHRISTIANO is adequately controlled with Auto CPAP at the current settings per compliance DL.   Prescription provided for renewal of PAP supplies.  Recommended him/her to continue using the CPAP regularly during sleep and instructed  to get  the supplies for the PAP replaced regularly.    Patient instructed to remember to bring PAP with him/her if hospitalized and if anticipating procedure that requires sedation/surgery to be sure to discuss with the provider/surgeon that he/she has sleep apnea and uses PAP therapy.

## 2024-10-25 NOTE — PATIENT INSTRUCTIONS
MY INFORMATION ON SLEEP APNEA-  Jared Webber    DOCTOR : Bee Cotton MD  SLEEP CENTER :      MY CONTACT NUMBER:    [facility]  [phone]  [facility]      For general sleep health questions:   [email or web address]    For tips about PAP and COVID-19:  [email or web address]-[email or web address]-therapy.[email or web address]    For general info about COVID-19 including vaccines:  [email or web address]      Continue PAP therapy every night, for all hours that you are sleeping (including naps.)  As always, try to get at least 8 hours of sleep or more each day, keep a regular sleep schedule, and avoid sleep deprivation. Avoid alcohol.  Reasons that you might need a change to your pressure therapy would be weight gain or loss, waking having inadvertently removed your PAP overnight, having previously felt refreshed by sleep with CPAP use and now waking un-refreshed, and return of daytime sleepiness. Also, the development of new medical problems  (such as heart failure, stroke, medications such as narcotics) can sometimes affect breathing at night and change your PAP therapy needs.  Please bring PAP with you if you are hospitalized.  If anticipating surgery be sure to discuss with your surgeon that you have sleep apnea and use PAP therapy.    Maintain your equipment as recommended which includes routine cleaning and replacement of supplies.      Call DME for any questions regarding supplies or maintenance.    [facility], [facility] [phone]    Do not drive on engage in potentially dangerous activities if feeling sleepy.  Please follow up in sleep clinic again in 12 months.        Tips for your PAP use-    Mask fitting tips  Mask fitting exercise:    To improve your mask seal and your mobility at night, put mask on and secure in place.  Lie down in bed with full pressure and  roll to one side, adjust headgear while in that position to eliminate any leaks. Repeat process rolling to other side.     The mask seal does not have to be perfect:   CPAP machines are designed to make up for small leaks. However, you will not tolerate leaks blowing in your eyes so you will need to adjust.   Any leak should only be near or at the bottom of the mask.  We expect your mask to leak slightly at night.    Do not over-tighten the headgear straps, tighter IS NOT better, we expect minimal leak.    First try re-positioning the mask or headgear before tightening the headgear straps.  Mask leaks are expected due to changing sleeping positions. Try pulling the mask away from your skin allowing the cushion to re-inflate will minimize the leak.  If you struggle for a good fit, try turning the CPAP off and then readjust the mask by pulling it away from your face and then turning back on the CPAP.        Humidifier tips  Humidifiers can be adjusted to increase or decrease the amount of moisture according to your comfort level. You may need to adjust this frequently at first, but then might only change it with seasonal weather changes.     Try INCREASING the humidity if:  You experience a dry, irritated nasal passage or throat.  You have a runny, drippy nose or sneezing fits after using CPAP.  You experience nasal congestion during or after CPAP use.    Try DECREASING the humidity if:  You have excessive condensation or  rain out  in the tubing or mask.  Otherwise keep the tubing warm during the night by running it underneath the blankets or pillow.      Clinic visit after initial PAP set-up   Bring your equipment with you to your 5-8 week follow up clinic visit.  We will be extracting your data from the machine if not available from the cloud based [employer].        Travel  Always take your equipment with you when you travel.  If you fly with your equipment bring it on with you as a carry on.  Medical equipment does  not count as a carry on.    If you travel international the machines take 110-240v.  The only adapter needed is the adapter that will fit into the receptacle (outlet).    You may also want to bring an extension cord as many hotel rooms have limited outlets at the bedside.  Do not travel with water in your humidifier chamber.     Cleaning and Maintenance Guidelines    Equipment Frequency Cleaning Method   Mask First Day    Daily      Weekly Soak mask in hot soapy water for 30 minutes, rinse and air dry.  Wipe nasal cushion with a hot soapy (Ivory, baby shampoo) cloth and rinse.  Baby wipes may also be used.  Do not use anti-bacterial soaps,Marni  liquid soap, rubbing alcohol, bleach or ammonia.  Wash frame in hot soapy water (Ivory, baby shampoo) rinse and let air dry   Headgear Biweekly Wash in hot soapy water, rinse and air dry   Reusable Gray Filter Weekly Wash in hot soapy water, rinse, put in towel squeeze moisture out, let air dry   Disposable White Filter Check Weekly Replace when brown or gray in color; at least every 2 to 3 months   Humidifier Chamber Daily    Weekly Empty distilled water from humidifier and let air dry    Hand wash in hot soapy water, rinse and air dry   Tubing Weekly Wash in hot soapy water, rinse and let air dry   Mask, Tubing and Humidifier Chamber As needed Disinfect: Soak in 1 part distilled white vinegar to 3 parts hot water for 30 minutes, rinse well and air dry  Not the material headgear        MASK AND SUPPLY REORDERING and EQUIPMENT NEEDS through your DME and per your insurance  Reminder: Most insurance companies will allow for a new mask, headgear, tubing, and reusable gray filter every six months.  Disposable white ultra-fine filters are covered monthly.      HOME AND SAFETY INSTRUCTIONS  Do not use frayed or cracked electrical cords, multi plug adaptors, or switched receptacles  Do not immerse electrical equipment into water  Assure that electrical cords do not become a tripping  hazard

## 2024-10-28 ENCOUNTER — TELEPHONE (OUTPATIENT)
Dept: SLEEP MEDICINE | Facility: CLINIC | Age: 49
End: 2024-10-28
Payer: COMMERCIAL

## 2024-10-28 NOTE — TELEPHONE ENCOUNTER
CALLED PT TO SEE IF HE IS WANTING TO TRANSFER TO [facility]. HE STATED HE WOULD LIKE TO AND THAT HE HAD TALKED W/ US BEFORE AND HIS INSURANCE WAS NOT PAYING FOR THE MACHINE AND NOW HE HAS NEW INSURANCE AND HE WAS TOLD TO RETURN THE OTHER MACHINE TO  AND GET A MACHINE FROM [facility]. I WAS TRYING TO EXPLAIN THAT WE USUALLY WAIT UNTIL THE MACHINE IS NO LONGER RENTING BUT PT WAS CONFUSED OR I WAS CONFUSED AND WE WERE NOT GETTING ANYTHING SLOVED. TOLD HIM I WILL HAVE MY SUPERVISOR REACH OUT AS SHE WILL UNDERSTAND BILLING BETTER THAN I DO. REACHED OUT TO JUAN ALBERTO SCHULER TO CALL PT.

## 2024-11-05 DIAGNOSIS — M1A.0790 CHRONIC IDIOPATHIC GOUT INVOLVING TOE WITHOUT TOPHUS, UNSPECIFIED LATERALITY: Primary | ICD-10-CM

## 2024-11-07 RX ORDER — ALLOPURINOL 100 MG/1
200 TABLET ORAL DAILY
Qty: 180 TABLET | Refills: 1 | Status: SHIPPED | OUTPATIENT
Start: 2024-11-07

## 2024-11-19 ENCOUNTER — NURSE TRIAGE (OUTPATIENT)
Dept: FAMILY MEDICINE | Facility: CLINIC | Age: 49
End: 2024-11-19

## 2024-11-19 ENCOUNTER — E-VISIT (OUTPATIENT)
Dept: FAMILY MEDICINE | Facility: CLINIC | Age: 49
End: 2024-11-19
Payer: COMMERCIAL

## 2024-11-19 DIAGNOSIS — M10.071 ACUTE IDIOPATHIC GOUT OF RIGHT FOOT: Primary | ICD-10-CM

## 2024-11-19 RX ORDER — COLCHICINE 0.6 MG/1
TABLET ORAL
Qty: 30 TABLET | Refills: 5 | Status: SHIPPED | OUTPATIENT
Start: 2024-11-19

## 2024-11-19 RX ORDER — PREDNISONE 20 MG/1
TABLET ORAL
Qty: 20 TABLET | Refills: 0 | Status: SHIPPED | OUTPATIENT
Start: 2024-11-19

## 2024-11-19 NOTE — TELEPHONE ENCOUNTER
Nurse Triage SBAR    Is this a 2nd Level Triage? YES, LICENSED PRACTITIONER REVIEW IS REQUIRED    Situation: Pain, redness and swelling in right great toe joint    Background:   History of gout and flares up similar to this one. Patient states he has previously been prescribed cochicine for these flare ups    Assessment: Last night pain started experiencing pain, redness and swelling of right great toe joint. These symptoms are consistent with previous gout flare ups that patient has experienced.    Protocol Recommended Disposition:   See in Office Within 3 Days, See More Appropriate Protocol    Recommendation: Patient is unable to come in for office visit due to work schedule. Wondering if this would be appropriate to treat via e-visit? He is scheduled next week with PCP for med check    Routed to provider    Does the patient meet one of the following criteria for ADS visit consideration? 16+ years old, with an [facility] PCP     TIP  Providers, please consider if this condition is appropriate for management at one of our Acute and Diagnostic Services sites.     If patient is a good candidate, please use dotphrase <dot>triageresponse and select Refer to ADS to document.     Reason for Disposition   Toe pain is main symptom   Pain in the big toe joint    Protocols used: Foot Pain-A-OH, Toe Pain-A-[place]

## 2024-12-27 PROBLEM — D12.6 ADENOMATOUS POLYP OF COLON: Status: ACTIVE | Noted: 2024-12-27

## 2025-04-17 ENCOUNTER — PATIENT OUTREACH (OUTPATIENT)
Dept: CARE COORDINATION | Facility: CLINIC | Age: 50
End: 2025-04-17
Payer: COMMERCIAL

## 2025-04-28 DIAGNOSIS — M1A.0790 CHRONIC IDIOPATHIC GOUT INVOLVING TOE WITHOUT TOPHUS, UNSPECIFIED LATERALITY: ICD-10-CM

## 2025-04-28 RX ORDER — ALLOPURINOL 100 MG/1
200 TABLET ORAL DAILY
Qty: 180 TABLET | Refills: 1 | Status: SHIPPED | OUTPATIENT
Start: 2025-04-28

## 2025-05-01 ENCOUNTER — PATIENT OUTREACH (OUTPATIENT)
Dept: CARE COORDINATION | Facility: CLINIC | Age: 50
End: 2025-05-01
Payer: COMMERCIAL

## 2025-05-30 DIAGNOSIS — F41.1 GAD (GENERALIZED ANXIETY DISORDER): ICD-10-CM

## 2025-05-30 DIAGNOSIS — F33.2 SEVERE EPISODE OF RECURRENT MAJOR DEPRESSIVE DISORDER, WITHOUT PSYCHOTIC FEATURES (H): ICD-10-CM

## 2025-06-01 DIAGNOSIS — I77.9 PERIPHERAL ARTERIAL OCCLUSIVE DISEASE: ICD-10-CM

## 2025-06-02 RX ORDER — ROSUVASTATIN CALCIUM 20 MG/1
20 TABLET, COATED ORAL DAILY
Qty: 90 TABLET | Refills: 3 | OUTPATIENT
Start: 2025-06-02

## 2025-06-02 RX ORDER — SERTRALINE HYDROCHLORIDE 100 MG/1
150 TABLET, FILM COATED ORAL DAILY
Qty: 135 TABLET | Refills: 0 | Status: SHIPPED | OUTPATIENT
Start: 2025-06-02

## 2025-06-25 DIAGNOSIS — F41.1 GAD (GENERALIZED ANXIETY DISORDER): ICD-10-CM

## 2025-06-25 RX ORDER — BUSPIRONE HYDROCHLORIDE 10 MG/1
TABLET ORAL
Qty: 270 TABLET | Refills: 1 | Status: SHIPPED | OUTPATIENT
Start: 2025-06-25

## 2025-08-25 ENCOUNTER — PATIENT OUTREACH (OUTPATIENT)
Dept: GASTROENTEROLOGY | Facility: CLINIC | Age: 50
End: 2025-08-25
Payer: COMMERCIAL